# Patient Record
Sex: FEMALE | Race: BLACK OR AFRICAN AMERICAN | NOT HISPANIC OR LATINO | Employment: UNEMPLOYED | URBAN - METROPOLITAN AREA
[De-identification: names, ages, dates, MRNs, and addresses within clinical notes are randomized per-mention and may not be internally consistent; named-entity substitution may affect disease eponyms.]

---

## 2023-05-08 ENCOUNTER — APPOINTMENT (OUTPATIENT)
Dept: LAB | Facility: LAB | Age: 38
End: 2023-05-08
Payer: COMMERCIAL

## 2023-05-08 LAB
DEHYDROEPIANDROSTERONE SULFATE (DHEA-S) (UG/DL) IN SER/: 80 UG/DL (ref 12–379)
ERYTHROCYTE DISTRIBUTION WIDTH (RATIO) BY AUTOMATED COUNT: 14.1 % (ref 11.5–14.5)
ERYTHROCYTE MEAN CORPUSCULAR HEMOGLOBIN CONCENTRATION (G/DL) BY AUTOMATED: 31.4 G/DL (ref 32–36)
ERYTHROCYTE MEAN CORPUSCULAR VOLUME (FL) BY AUTOMATED COUNT: 85 FL (ref 80–100)
ERYTHROCYTES (10*6/UL) IN BLOOD BY AUTOMATED COUNT: 3.88 X10E12/L (ref 4–5.2)
ESTIMATED AVERAGE GLUCOSE FOR HBA1C: 151 MG/DL
ESTRADIOL (PG/ML) IN SER/PLAS: 59 PG/ML
FERRITIN, PREGNANCY: 42 UG/L
FOLATE, SERUM, PREGNANCY: 14 NG/ML
FOLLITROPIN (IU/L) IN SER/PLAS: 9.1 IU/L
HEMATOCRIT (%) IN BLOOD BY AUTOMATED COUNT: 33.1 % (ref 36–46)
HEMOGLOBIN (G/DL) IN BLOOD: 10.4 G/DL (ref 12–16)
HEMOGLOBIN A1C/HEMOGLOBIN TOTAL IN BLOOD: 6.9 %
IRON (UG/DL) IN SER/PLAS IN PREGNANCY: 41 UG/DL
IRON BINDING CAPACITY (UG/DL) IN PREGNANCY: 426 UG/DL
IRON SATURATION (%) IN PREGNANCY: 10 %
LEUKOCYTES (10*3/UL) IN BLOOD BY AUTOMATED COUNT: 7.1 X10E9/L (ref 4.4–11.3)
NRBC (PER 100 WBCS) BY AUTOMATED COUNT: 0 /100 WBC (ref 0–0)
PLATELETS (10*3/UL) IN BLOOD AUTOMATED COUNT: 306 X10E9/L (ref 150–450)
PROLACTIN (UG/L) IN SER/PLAS: 2.4 UG/L (ref 3–20)
REFLEX ADDED, ANEMIA PANEL: ABNORMAL
THYROTROPIN (MIU/L) IN SER/PLAS BY DETECTION LIMIT <= 0.05 MIU/L: 0.51 MIU/L (ref 0.44–3.98)
VITAMIN B12, PREGNANCY: 660 PG/ML

## 2023-05-15 LAB
17-HYDROXYPROGESTERONE (REFLAB): 13.24 NG/DL
TESTOSTERONE FREE (CHAN): 3.1 PG/ML (ref 0.1–6.4)
TESTOSTERONE,TOTAL,LC-MS/MS: 19 NG/DL (ref 2–45)

## 2023-06-06 LAB
ALANINE AMINOTRANSFERASE (SGPT) (U/L) IN SER/PLAS: 19 U/L (ref 7–45)
ALBUMIN (G/DL) IN SER/PLAS: 4 G/DL (ref 3.4–5)
ALBUMIN (MG/L) IN URINE: 20.6 MG/L
ALBUMIN/CREATININE (UG/MG) IN URINE: 6.4 UG/MG CRT (ref 0–30)
ALKALINE PHOSPHATASE (U/L) IN SER/PLAS: 65 U/L (ref 33–110)
ANION GAP IN SER/PLAS: 13 MMOL/L (ref 10–20)
ASPARTATE AMINOTRANSFERASE (SGOT) (U/L) IN SER/PLAS: 20 U/L (ref 9–39)
BILIRUBIN TOTAL (MG/DL) IN SER/PLAS: 0.5 MG/DL (ref 0–1.2)
CALCIUM (MG/DL) IN SER/PLAS: 9.1 MG/DL (ref 8.6–10.3)
CARBON DIOXIDE, TOTAL (MMOL/L) IN SER/PLAS: 25 MMOL/L (ref 21–32)
CHLORIDE (MMOL/L) IN SER/PLAS: 106 MMOL/L (ref 98–107)
CHOLESTEROL (MG/DL) IN SER/PLAS: 189 MG/DL (ref 0–199)
CHOLESTEROL IN HDL (MG/DL) IN SER/PLAS: 39.5 MG/DL
CHOLESTEROL/HDL RATIO: 4.8
CREATININE (MG/DL) IN SER/PLAS: 0.87 MG/DL (ref 0.5–1.05)
CREATININE (MG/DL) IN URINE: 320 MG/DL (ref 20–320)
GFR FEMALE: 88 ML/MIN/1.73M2
GLUCOSE (MG/DL) IN SER/PLAS: 133 MG/DL (ref 74–99)
LDL: 123 MG/DL (ref 0–99)
POTASSIUM (MMOL/L) IN SER/PLAS: 4 MMOL/L (ref 3.5–5.3)
PROTEIN TOTAL: 7.7 G/DL (ref 6.4–8.2)
SODIUM (MMOL/L) IN SER/PLAS: 140 MMOL/L (ref 136–145)
TRIGLYCERIDE (MG/DL) IN SER/PLAS: 134 MG/DL (ref 0–149)
UREA NITROGEN (MG/DL) IN SER/PLAS: 9 MG/DL (ref 6–23)
VLDL: 27 MG/DL (ref 0–40)

## 2023-06-21 LAB
CHLAMYDIA TRACH., AMPLIFIED: NEGATIVE
N. GONORRHEA, AMPLIFIED: NEGATIVE
TRICHOMONAS VAGINALIS: NEGATIVE

## 2023-09-06 LAB
DEHYDROEPIANDROSTERONE SULFATE (DHEA-S) (UG/DL) IN SER/: 62 UG/DL (ref 12–379)
ERYTHROCYTE DISTRIBUTION WIDTH (RATIO) BY AUTOMATED COUNT: 15.4 % (ref 11.5–14.5)
ERYTHROCYTE MEAN CORPUSCULAR HEMOGLOBIN CONCENTRATION (G/DL) BY AUTOMATED: 31.1 G/DL (ref 32–36)
ERYTHROCYTE MEAN CORPUSCULAR VOLUME (FL) BY AUTOMATED COUNT: 83 FL (ref 80–100)
ERYTHROCYTES (10*6/UL) IN BLOOD BY AUTOMATED COUNT: 4.09 X10E12/L (ref 4–5.2)
ESTIMATED AVERAGE GLUCOSE FOR HBA1C: 140 MG/DL
FERRITIN (UG/LL) IN SER/PLAS: 20 UG/L (ref 8–150)
HEMATOCRIT (%) IN BLOOD BY AUTOMATED COUNT: 33.8 % (ref 36–46)
HEMOGLOBIN (G/DL) IN BLOOD: 10.5 G/DL (ref 12–16)
HEMOGLOBIN A1C/HEMOGLOBIN TOTAL IN BLOOD: 6.5 %
IRON (UG/DL) IN SER/PLAS: 49 UG/DL (ref 35–150)
IRON BINDING CAPACITY (UG/DL) IN SER/PLAS: 460 UG/DL (ref 240–445)
IRON SATURATION (%) IN SER/PLAS: 11 % (ref 25–45)
LEUKOCYTES (10*3/UL) IN BLOOD BY AUTOMATED COUNT: 5.2 X10E9/L (ref 4.4–11.3)
PLATELETS (10*3/UL) IN BLOOD AUTOMATED COUNT: 282 X10E9/L (ref 150–450)
THYROTROPIN (MIU/L) IN SER/PLAS BY DETECTION LIMIT <= 0.05 MIU/L: 0.91 MIU/L (ref 0.44–3.98)

## 2023-09-08 LAB
IGF 1 (INSULIN-LIKE GROWTH FACTOR 1): 185 NG/ML (ref 80–277)
IGF 1 Z SCORE CALCULATION: 0.6

## 2023-11-16 ENCOUNTER — APPOINTMENT (OUTPATIENT)
Dept: OBSTETRICS AND GYNECOLOGY | Facility: CLINIC | Age: 38
End: 2023-11-16
Payer: COMMERCIAL

## 2023-11-21 ENCOUNTER — APPOINTMENT (OUTPATIENT)
Dept: OBSTETRICS AND GYNECOLOGY | Facility: CLINIC | Age: 38
End: 2023-11-21
Payer: COMMERCIAL

## 2023-12-15 ENCOUNTER — OFFICE VISIT (OUTPATIENT)
Dept: ENDOCRINOLOGY | Facility: CLINIC | Age: 38
End: 2023-12-15
Payer: COMMERCIAL

## 2023-12-15 VITALS
HEIGHT: 67 IN | DIASTOLIC BLOOD PRESSURE: 104 MMHG | HEART RATE: 83 BPM | BODY MASS INDEX: 37.72 KG/M2 | SYSTOLIC BLOOD PRESSURE: 159 MMHG | WEIGHT: 240.3 LBS

## 2023-12-15 DIAGNOSIS — I10 PRIMARY HYPERTENSION: ICD-10-CM

## 2023-12-15 DIAGNOSIS — N92.1 MENORRHAGIA WITH IRREGULAR CYCLE: ICD-10-CM

## 2023-12-15 DIAGNOSIS — E66.9 CLASS 2 OBESITY: ICD-10-CM

## 2023-12-15 DIAGNOSIS — D50.9 IRON DEFICIENCY ANEMIA, UNSPECIFIED IRON DEFICIENCY ANEMIA TYPE: ICD-10-CM

## 2023-12-15 DIAGNOSIS — E11.65 TYPE 2 DIABETES MELLITUS WITH HYPERGLYCEMIA, WITHOUT LONG-TERM CURRENT USE OF INSULIN (MULTI): Primary | ICD-10-CM

## 2023-12-15 PROBLEM — E66.812 CLASS 2 OBESITY: Status: ACTIVE | Noted: 2023-12-15

## 2023-12-15 LAB — POC HEMOGLOBIN A1C: 7.8 % (ref 4.2–6.5)

## 2023-12-15 PROCEDURE — 3044F HG A1C LEVEL LT 7.0%: CPT | Performed by: INTERNAL MEDICINE

## 2023-12-15 PROCEDURE — 3077F SYST BP >= 140 MM HG: CPT | Performed by: INTERNAL MEDICINE

## 2023-12-15 PROCEDURE — 3080F DIAST BP >= 90 MM HG: CPT | Performed by: INTERNAL MEDICINE

## 2023-12-15 PROCEDURE — 4010F ACE/ARB THERAPY RXD/TAKEN: CPT | Performed by: INTERNAL MEDICINE

## 2023-12-15 PROCEDURE — 99215 OFFICE O/P EST HI 40 MIN: CPT | Performed by: INTERNAL MEDICINE

## 2023-12-15 PROCEDURE — 83036 HEMOGLOBIN GLYCOSYLATED A1C: CPT | Performed by: INTERNAL MEDICINE

## 2023-12-15 RX ORDER — LANCETS 33 GAUGE
EACH MISCELLANEOUS
Qty: 100 EACH | Refills: 3 | Status: SHIPPED | OUTPATIENT
Start: 2023-12-15

## 2023-12-15 RX ORDER — METFORMIN HYDROCHLORIDE 500 MG/1
500 TABLET ORAL
COMMUNITY
Start: 2023-09-28 | End: 2024-01-03 | Stop reason: SDUPTHER

## 2023-12-15 RX ORDER — LISINOPRIL 20 MG/1
20 TABLET ORAL DAILY
COMMUNITY
Start: 2017-07-07 | End: 2024-05-10 | Stop reason: DRUGHIGH

## 2023-12-15 RX ORDER — IBUPROFEN 200 MG
CAPSULE ORAL
Qty: 100 STRIP | Refills: 3 | Status: SHIPPED | OUTPATIENT
Start: 2023-12-15

## 2023-12-15 NOTE — PROGRESS NOTES
"Subjective   Lizzy Rouqe is a 38 y.o. female who presents for follow-up of Type 2 diabetes mellitus.   Last visit: 9/1/23  Complications: none known  Comorbidities: HTN, h/o heavy frequent/irregular menses, obesity, sarcoid (remote glucocorticoid hx)    DIABETES Hx:  Diagnosed diagnosed May 2023 with A1c 6.9   Prior meds: none     CURRENT MED MANAGEMENT  Metformin 500mg, 2 tabs BID-AC - still not taking, caused some GI upset, but main bc was concerned it caused brother's renal failure  Lisinopril 20mg - hasn't taken in quite awhile  Iron - taking daily  cOCP - taking     Periods: now regular on OCP  No UTIs, no vision problems; having headaches      BG MONITORING:  not checking currently; no interest in CGM    SOCIAL:     Diet: lots of fruit, got a juicer     Exercise: none    ROS: otherwise negative    Objective   BP (!) 159/104 (BP Location: Right arm, Patient Position: Sitting, BP Cuff Size: Adult)   Pulse 83   Ht 1.702 m (5' 7\")   Wt 109 kg (240 lb 4.8 oz)   BMI 37.64 kg/m²   Physical Exam  Vitals reviewed.   Constitutional:       Appearance: Normal appearance.   HENT:      Nose: No rhinorrhea.      Mouth/Throat:      Mouth: Mucous membranes are moist.   Eyes:      Conjunctiva/sclera: Conjunctivae normal.   Pulmonary:      Effort: Pulmonary effort is normal.   Skin:     General: Skin is warm and dry.      Findings: No lesion or rash.   Neurological:      General: No focal deficit present.      Mental Status: She is alert.   Psychiatric:         Mood and Affect: Mood normal.       DIABETES SCREENS:   Eye exam: unsure  Foot exam: unk  POC HEMOGLOBIN A1c (%)   Date Value   12/15/2023 7.8 (A)     Hemoglobin A1C (%)   Date Value   09/06/2023 6.5 (A)   05/08/2023 6.9 (A)     Albumin/Creatine Ratio (ug/mg crt)   Date Value   06/06/2023 6.4     LDL (mg/dL)   Date Value   06/06/2023 123 (H)     Triglycerides (mg/dL)   Date Value   06/06/2023 134     Creatinine (mg/dL)   Date Value   06/06/2023 0.87 "         Assessment/Plan   Type 2 diabetes mellitus   uptrend in A1c now above goal  No known complications  Stopped taking metformin  -     for now restart metformin but will try to switch to empagliflozin (Jardiance) 10 mg daily  -     start using glucometer to check blood glucose 3 times weekly  -     schedule retinopathy screening    Primary hypertension  BP elevated today; off lisinopril        -     restart lisinopril    Iron deficiency anemia, unspecified iron deficiency anemia type  -     c/w iron supp for now; take every other day  -     repeat ferritin & iron studies    Menorrhagia with irregular cycle        -      c/w cOCP    Class 2 obesity        -      lifestyle modifications reviewed, discussed would avoid juicing as a dieting plan    Follow-up in 3-4 months

## 2023-12-15 NOTE — PATIENT INSTRUCTIONS
Great to see you! A1c is up a bit, 7.8%, and your blood pressure is elevated  Please restart your lisinopril and metformin (2 pills once a day)  We'll see if we can switch you to Jardiance, which also helps protect kidneys from diabetes.  It's about time to recheck your iron levels; other labs will be due next summer.  Start checking blood sugar 3 times per week (goal fasting is , goal random is less than 180).  Schedule diabetes eye exam.  Follow-up in about 3 months

## 2023-12-16 ENCOUNTER — PATIENT MESSAGE (OUTPATIENT)
Dept: ENDOCRINOLOGY | Facility: CLINIC | Age: 38
End: 2023-12-16
Payer: COMMERCIAL

## 2023-12-16 DIAGNOSIS — E11.65 TYPE 2 DIABETES MELLITUS WITH HYPERGLYCEMIA, WITHOUT LONG-TERM CURRENT USE OF INSULIN (MULTI): Primary | ICD-10-CM

## 2023-12-19 RX ORDER — DEXTROSE 4 G
TABLET,CHEWABLE ORAL
Qty: 1 EACH | Refills: 0 | Status: SHIPPED | OUTPATIENT
Start: 2023-12-19

## 2024-01-03 DIAGNOSIS — E11.65 TYPE 2 DIABETES MELLITUS WITH HYPERGLYCEMIA, WITHOUT LONG-TERM CURRENT USE OF INSULIN (MULTI): ICD-10-CM

## 2024-01-04 RX ORDER — METFORMIN HYDROCHLORIDE 500 MG/1
TABLET ORAL
Qty: 360 TABLET | Refills: 3 | Status: SHIPPED | OUTPATIENT
Start: 2024-01-04 | End: 2024-05-10 | Stop reason: DRUGHIGH

## 2024-05-10 ENCOUNTER — OFFICE VISIT (OUTPATIENT)
Dept: ENDOCRINOLOGY | Facility: CLINIC | Age: 39
End: 2024-05-10
Payer: COMMERCIAL

## 2024-05-10 VITALS
TEMPERATURE: 98.8 F | DIASTOLIC BLOOD PRESSURE: 105 MMHG | SYSTOLIC BLOOD PRESSURE: 152 MMHG | HEART RATE: 104 BPM | RESPIRATION RATE: 20 BRPM | HEIGHT: 67 IN | BODY MASS INDEX: 38.14 KG/M2 | WEIGHT: 243 LBS

## 2024-05-10 DIAGNOSIS — E66.9 CLASS 2 OBESITY: ICD-10-CM

## 2024-05-10 DIAGNOSIS — I10 PRIMARY HYPERTENSION: ICD-10-CM

## 2024-05-10 DIAGNOSIS — D50.9 IRON DEFICIENCY ANEMIA, UNSPECIFIED IRON DEFICIENCY ANEMIA TYPE: ICD-10-CM

## 2024-05-10 DIAGNOSIS — E11.65 TYPE 2 DIABETES MELLITUS WITH HYPERGLYCEMIA, WITHOUT LONG-TERM CURRENT USE OF INSULIN (MULTI): Primary | ICD-10-CM

## 2024-05-10 LAB — POC HEMOGLOBIN A1C: 7.1 % (ref 4.2–6.5)

## 2024-05-10 PROCEDURE — 3080F DIAST BP >= 90 MM HG: CPT | Performed by: INTERNAL MEDICINE

## 2024-05-10 PROCEDURE — 99214 OFFICE O/P EST MOD 30 MIN: CPT | Performed by: INTERNAL MEDICINE

## 2024-05-10 PROCEDURE — 4010F ACE/ARB THERAPY RXD/TAKEN: CPT | Performed by: INTERNAL MEDICINE

## 2024-05-10 PROCEDURE — 83036 HEMOGLOBIN GLYCOSYLATED A1C: CPT | Performed by: INTERNAL MEDICINE

## 2024-05-10 PROCEDURE — 3077F SYST BP >= 140 MM HG: CPT | Performed by: INTERNAL MEDICINE

## 2024-05-10 RX ORDER — METFORMIN HYDROCHLORIDE 500 MG/1
1000 TABLET, EXTENDED RELEASE ORAL
Qty: 180 TABLET | Refills: 3 | Status: SHIPPED | OUTPATIENT
Start: 2024-05-10 | End: 2025-05-10

## 2024-05-10 RX ORDER — LISINOPRIL 40 MG/1
40 TABLET ORAL DAILY
Qty: 90 TABLET | Refills: 3 | Status: SHIPPED | OUTPATIENT
Start: 2024-05-10 | End: 2025-05-10

## 2024-05-10 NOTE — PROGRESS NOTES
"Subjective   Lizzy Roque is a 38 y.o. female who presents for follow-up of Type 2 diabetes mellitus.   Last visit: 12/15/23  Complications: none known  Comorbidities: HTN, h/o heavy frequent/irregular menses, obesity, sarcoid (remote glucocorticoid hx)    DIABETES Hx:  Diagnosed diagnosed May 2023 with A1c 6.9     INTERVAL Hx:  LMP 4 weeks ago, off OCP, PP next week   Afraid of needles, hasn't gotten labs done yet    CURRENT DIABETES MANAGEMENT  Metformin 500mg, 2 tabs BID-AC - still not taking due to diarrhea  Jardiance 10mg daily  Lisinopril 20mg - reports taking regularly  cOCP - stopped taking      BG MONITORING:  not using glucometer lately, doesn't want cgm    ROS: otherwise negative    Objective   BP (!) 152/105 (BP Location: Right arm, Patient Position: Sitting, BP Cuff Size: Large adult)   Pulse 104   Temp 37.1 °C (98.8 °F) (Tympanic)   Resp 20   Ht 1.702 m (5' 7\")   Wt 110 kg (243 lb)   BMI 38.06 kg/m²     Physical Exam  Constitutional:       Appearance: Normal appearance.   HENT:      Nose: No rhinorrhea.      Mouth/Throat:      Mouth: Mucous membranes are moist.   Eyes:      Conjunctiva/sclera: Conjunctivae normal.   Pulmonary:      Effort: Pulmonary effort is normal.   Skin:     General: Skin is warm and dry.      Findings: No lesion or rash.   Neurological:      General: No focal deficit present.      Mental Status: She is alert.   Psychiatric:         Mood and Affect: Mood normal.     DIABETES SCREENS:   Eye exam: unsure  Foot exam: unsure  POC HEMOGLOBIN A1c (%)   Date Value   05/10/2024 7.1 (A)   12/15/2023 7.8 (A)     Hemoglobin A1C (%)   Date Value   09/06/2023 6.5 (A)   05/08/2023 6.9 (A)     Albumin/Creatine Ratio (ug/mg crt)   Date Value   06/06/2023 6.4     LDL (mg/dL)   Date Value   06/06/2023 123 (H)     Triglycerides (mg/dL)   Date Value   06/06/2023 134     Creatinine (mg/dL)   Date Value   06/06/2023 0.87         Assessment/Plan   38 y.o. F with   Type 2 diabetes mellitus with " hyperglycemia, without long-term current use of insulin   A1c closer to goal, no known complications, trouble with med adherence, GI side effects with metformin    Primary hypertension  BP elevated again today, asymptomatic    Class 2 obesity  Weight stable    Iron deficiency anemia, unspecified iron deficiency anemia type  Has not yet had labs drawn, ordered last time    PLAN  -     increase lisinopril to 40 mg by mouth once daily.  -     switch to metFORMIN  mg 24 hr tablet; Take 2 tablets (1,000 mg) by mouth once daily with dinner  -     c/w jardiance  -     labs as previously ordered  -     FUV 3 mo

## 2024-05-10 NOTE — PATIENT INSTRUCTIONS
Continue jardiance  Try long-acting metformin 1000mg with dinner  Increase lisinopril to 40 mg daily  Follow-up in 3 months

## 2024-09-30 ENCOUNTER — LAB (OUTPATIENT)
Dept: LAB | Facility: LAB | Age: 39
End: 2024-09-30
Payer: COMMERCIAL

## 2024-10-04 ENCOUNTER — APPOINTMENT (OUTPATIENT)
Dept: ENDOCRINOLOGY | Facility: CLINIC | Age: 39
End: 2024-10-04
Payer: COMMERCIAL

## 2024-10-04 VITALS
TEMPERATURE: 97.7 F | HEIGHT: 67 IN | BODY MASS INDEX: 37.92 KG/M2 | SYSTOLIC BLOOD PRESSURE: 127 MMHG | HEART RATE: 85 BPM | DIASTOLIC BLOOD PRESSURE: 80 MMHG | WEIGHT: 241.6 LBS | RESPIRATION RATE: 20 BRPM

## 2024-10-04 DIAGNOSIS — D50.9 IRON DEFICIENCY ANEMIA, UNSPECIFIED IRON DEFICIENCY ANEMIA TYPE: ICD-10-CM

## 2024-10-04 DIAGNOSIS — E66.812 CLASS 2 OBESITY: ICD-10-CM

## 2024-10-04 DIAGNOSIS — E11.65 TYPE 2 DIABETES MELLITUS WITH HYPERGLYCEMIA, WITHOUT LONG-TERM CURRENT USE OF INSULIN: Primary | ICD-10-CM

## 2024-10-04 DIAGNOSIS — I10 PRIMARY HYPERTENSION: ICD-10-CM

## 2024-10-04 LAB — POC HEMOGLOBIN A1C: 7.1 % (ref 4.2–6.5)

## 2024-10-04 PROCEDURE — 99215 OFFICE O/P EST HI 40 MIN: CPT | Performed by: INTERNAL MEDICINE

## 2024-10-04 PROCEDURE — 3074F SYST BP LT 130 MM HG: CPT | Performed by: INTERNAL MEDICINE

## 2024-10-04 PROCEDURE — 3079F DIAST BP 80-89 MM HG: CPT | Performed by: INTERNAL MEDICINE

## 2024-10-04 PROCEDURE — 4010F ACE/ARB THERAPY RXD/TAKEN: CPT | Performed by: INTERNAL MEDICINE

## 2024-10-04 PROCEDURE — 3008F BODY MASS INDEX DOCD: CPT | Performed by: INTERNAL MEDICINE

## 2024-10-04 PROCEDURE — 83036 HEMOGLOBIN GLYCOSYLATED A1C: CPT | Performed by: INTERNAL MEDICINE

## 2024-10-04 NOTE — PROGRESS NOTES
"Subjective   Lizzy Roque is a 39 y.o. female who presents for follow-up of Type 2 diabetes mellitus.   Last visit: 5/10/24  Complications: none known  Comorbidities: HTN, h/o heavy frequent/irregular menses, obesity, sarcoid (remote glucocorticoid hx)    DIABETES Hx:  Diagnosed diagnosed May 2023 with A1c 6.9      INTERVAL Hx:   Did a blood pressure study checking BP 3x/day and was shocked to see BP was very high sometimes and prompted her to start taking her lisinopril regularly.  Did a study where they checked her A1c, and it was up to 8.1% (8/28/24) - so started taking jardiance regularly again.  Still not taking metformin though (causes diarrhea).    CURRENT DIABETES MANAGEMENT  Jardiance 10mg daily  Lisinopril 40mg   cOCP - stopped taking    BG MONITORING:  using glucometer, did not bring it in      ROS: otherwise negative    Objective   /80 (BP Location: Right arm, Patient Position: Sitting, BP Cuff Size: Large adult)   Pulse 85   Temp 36.5 °C (97.7 °F) (Tympanic)   Resp 20   Ht 1.702 m (5' 7\")   Wt 110 kg (241 lb 9.6 oz)   BMI 37.84 kg/m²     Physical Exam  Vitals reviewed.   Constitutional:       Appearance: Normal appearance.   HENT:      Nose: No rhinorrhea.      Mouth/Throat:      Mouth: Mucous membranes are moist.   Eyes:      Conjunctiva/sclera: Conjunctivae normal.   Pulmonary:      Effort: Pulmonary effort is normal.   Feet:      Right foot:      Protective Sensation: 10 sites tested.  10 sites sensed.      Left foot:      Protective Sensation: 10 sites tested.  9 sites sensed.      Comments: Sensation decreased over callus on R heel  Skin:     General: Skin is warm and dry.      Findings: No lesion or rash.   Neurological:      General: No focal deficit present.      Mental Status: She is alert.   Psychiatric:         Mood and Affect: Mood normal.         DIABETES SCREENS:   Eye exam: unsure  Foot exam: 10/4/24  POC HEMOGLOBIN A1c (%)   Date Value   10/04/2024 7.1 (A)   05/10/2024 " 7.1 (A)   12/15/2023 7.8 (A)     Albumin/Creatine Ratio (ug/mg crt)   Date Value   06/06/2023 6.4     LDL (mg/dL)   Date Value   06/06/2023 123 (H)     Triglycerides (mg/dL)   Date Value   06/06/2023 134     Creatinine (mg/dL)   Date Value   06/06/2023 0.87         Assessment/Plan   39 y.o. woman with   Type 2 diabetes mellitus with hyperglycemia, without long-term current use of insulin   A1c close to goal, no known complications, significant GI side effects with metformin     Primary hypertension - BP much improved on lisinopril     Class 2 obesity - Weight stable     Iron deficiency anemia - hasn't had labs drawn yet    PLAN  - continue lisinopril to 40 mg by mouth once daily.  - Diabetic Retinopathy Luminetics  - increase empagliflozin (Jardiance) to 25 mg by mouth once daily.  - labs, fasting:  -     Lipid panel; Future  -     Albumin-Creatinine Ratio, Urine Random; Future  -     Comprehensive Metabolic Panel; Future  -     Ferritin; Future  -     Iron and TIBC; Future  -     CBC; Future  - Referral to Ophthalmology; Future  - FUV 6 months or sooner PRN

## 2024-10-04 NOTE — PATIENT INSTRUCTIONS
Please get fasting labs drawn at your convenience (with urine protein screen)    We'll increase your Jardiance to 25mg (can double up your current pill until they're out)  Continue your Lisinopril    See you in 6 months!

## 2025-04-04 ENCOUNTER — APPOINTMENT (OUTPATIENT)
Dept: ENDOCRINOLOGY | Facility: CLINIC | Age: 40
End: 2025-04-04
Payer: COMMERCIAL

## 2025-10-24 ENCOUNTER — APPOINTMENT (OUTPATIENT)
Dept: ENDOCRINOLOGY | Facility: CLINIC | Age: 40
End: 2025-10-24
Payer: COMMERCIAL